# Patient Record
Sex: FEMALE | Race: WHITE | NOT HISPANIC OR LATINO | URBAN - METROPOLITAN AREA
[De-identification: names, ages, dates, MRNs, and addresses within clinical notes are randomized per-mention and may not be internally consistent; named-entity substitution may affect disease eponyms.]

---

## 2017-03-10 ENCOUNTER — IMPORTED ENCOUNTER (OUTPATIENT)
Dept: URBAN - METROPOLITAN AREA CLINIC 1 | Facility: CLINIC | Age: 62
End: 2017-03-10

## 2017-03-10 PROBLEM — H25.813: Noted: 2017-03-10

## 2017-03-10 PROBLEM — H04.123: Noted: 2017-03-10

## 2017-03-10 PROBLEM — D48.5: Noted: 2017-03-10

## 2017-03-10 PROBLEM — H16.143: Noted: 2017-03-10

## 2017-03-10 PROCEDURE — 92012 INTRM OPH EXAM EST PATIENT: CPT

## 2017-03-10 NOTE — PATIENT DISCUSSION
1.  LLL Mass of uncertain behavior- Pt reports rapid increase in growth recently. Discussed with patient to plan/ understand she will have lash loss in area of excision. Will cauterize base and heal by secondary intention. Pt understood. Plan excisional biopsy with pathology. Pros cons risks and benefits discussed. D/c ASA 81mg x10 days prior. 2.  Cataract OU: Observe for now without intervention. The patient was advised to contact us if any change or worsening of vision3. NATY w/ PEK OU- Cont ATs TID OU Routinely.  Schedule Excisional Biopsy LLL

## 2017-12-05 ENCOUNTER — IMPORTED ENCOUNTER (OUTPATIENT)
Dept: URBAN - METROPOLITAN AREA CLINIC 1 | Facility: CLINIC | Age: 62
End: 2017-12-05

## 2017-12-05 PROBLEM — H16.143: Noted: 2017-12-05

## 2017-12-05 PROBLEM — H25.813: Noted: 2017-12-05

## 2017-12-05 PROBLEM — H04.123: Noted: 2017-12-05

## 2017-12-05 PROCEDURE — 92015 DETERMINE REFRACTIVE STATE: CPT

## 2017-12-05 PROCEDURE — 92014 COMPRE OPH EXAM EST PT 1/>: CPT

## 2017-12-05 NOTE — PATIENT DISCUSSION
1.  Cataract OU: MRx for glasses given. 2.  NATY w/ PEK OU- Use ATs TID OU Routinely. 3.  H/o LL Mass of uncertain behavior- Patient had excised at [facility] by Dr. Roly Gilmore (See Attachments) Letter to [facility] for an appointment in 1 yr 27 with Dr. Deedee Mills.

## 2018-03-06 ENCOUNTER — IMPORTED ENCOUNTER (OUTPATIENT)
Dept: URBAN - METROPOLITAN AREA CLINIC 1 | Facility: CLINIC | Age: 63
End: 2018-03-06

## 2018-03-06 PROBLEM — H16.143: Noted: 2018-03-06

## 2018-03-06 PROBLEM — H04.123: Noted: 2018-03-06

## 2018-03-06 PROBLEM — H25.813: Noted: 2018-03-06

## 2018-03-06 PROCEDURE — 92012 INTRM OPH EXAM EST PATIENT: CPT

## 2018-03-06 PROCEDURE — 92015 DETERMINE REFRACTIVE STATE: CPT

## 2018-03-06 NOTE — PATIENT DISCUSSION
1.  Cataract OU:  Visually Significant secondary to glare discussed the risks benefits alternatives and limitations of cataract surgery. The patient stated a full understanding and a desire to proceed with the procedure. The patient will need to return for preop appointment with cataract measurements and to have any additional questions answered and start pre-operative eye drops as directed. Schedule phaco PCL OS then ODOtherwise follow-up in 6 months for a 30/glare2. NATY w/ decreased PEK OU- Improved. The continuation of artificial tears OU TID were recommended. 3.  Return for an appointment for ascan H&P with Dr. Marla Silva.

## 2018-05-01 ENCOUNTER — IMPORTED ENCOUNTER (OUTPATIENT)
Dept: URBAN - METROPOLITAN AREA CLINIC 1 | Facility: CLINIC | Age: 63
End: 2018-05-01

## 2018-05-01 PROBLEM — H25.812: Noted: 2018-05-01

## 2018-05-01 PROCEDURE — 92136 OPHTHALMIC BIOMETRY: CPT

## 2018-05-01 NOTE — PATIENT DISCUSSION
Cataract OS: Visually Significant secondary to glare discussed the risks benefits alternatives and limitations of cataract surgery. The patient stated a full understanding and a desire to proceed with the procedure. The patient will need to start pre-operative eye drops as directed. Proceed w/ phaco PCL OSPt understands they will need glasses post-op to achieve their best corrected vision. Return for an appointment for sx OS with Dr. Hemalatha Whitaker.

## 2018-05-09 ENCOUNTER — IMPORTED ENCOUNTER (OUTPATIENT)
Dept: URBAN - METROPOLITAN AREA CLINIC 1 | Facility: CLINIC | Age: 63
End: 2018-05-09

## 2018-05-09 PROBLEM — H25.812 COMBINED FORM OF SENILE CATARACT OF LEFT EYE: Status: ACTIVE | Noted: 2018-05-09

## 2018-05-09 PROBLEM — H25.812 COMBINED FORM OF SENILE CATARACT OF LEFT EYE: Status: RESOLVED | Noted: 2018-05-09 | Resolved: 2018-05-09

## 2018-05-10 ENCOUNTER — IMPORTED ENCOUNTER (OUTPATIENT)
Dept: URBAN - METROPOLITAN AREA CLINIC 1 | Facility: CLINIC | Age: 63
End: 2018-05-10

## 2018-05-10 PROBLEM — Z96.1: Noted: 2018-05-10

## 2018-05-10 PROCEDURE — 99024 POSTOP FOLLOW-UP VISIT: CPT

## 2018-05-10 NOTE — PATIENT DISCUSSION
POD#1 CE/IOL OS (Symfony Toric- ZXT)  doing well. Continue all 3 gtts as prescribed and until gone. Use Prednisolone BID OS Acular Qdaily OS Ocuflox TID OS Post op Warnings Reiterated RTC as scheduled

## 2018-05-17 ENCOUNTER — IMPORTED ENCOUNTER (OUTPATIENT)
Dept: URBAN - METROPOLITAN AREA CLINIC 1 | Facility: CLINIC | Age: 63
End: 2018-05-17

## 2018-05-17 PROBLEM — Z96.1: Noted: 2018-05-17

## 2018-05-17 PROBLEM — H25.811: Noted: 2018-05-17

## 2018-05-17 PROCEDURE — 92136 OPHTHALMIC BIOMETRY: CPT

## 2018-05-17 NOTE — PATIENT DISCUSSION
1.  Cataract OD: Visually Significant secondary to glare discussed the risks benefits alternatives and limitations of cataract surgery. The patient stated a full understanding and a desire to proceed with the procedure. The patient will need to start pre-operative eye drops as directed. Proceed w/ phaco PCL ODDiscussed with patient and patient understands halos around lights and glare are expected post-op particularly at night with the MF lens choice. Pt understood. 2.  POW#1  CE/IOL Symfony ZXT Toric OS doing well. Discontinue OcufloxContinue Prednisolone BID until gone. Continue Acular QD until gone. 3. Return for an appointment for sx OD with Dr. Harinder Dent.

## 2018-05-23 ENCOUNTER — IMPORTED ENCOUNTER (OUTPATIENT)
Dept: URBAN - METROPOLITAN AREA CLINIC 1 | Facility: CLINIC | Age: 63
End: 2018-05-23

## 2018-05-23 PROBLEM — H25.811 COMBINED FORM OF SENILE CATARACT OF RIGHT EYE: Status: ACTIVE | Noted: 2018-05-23

## 2018-05-23 PROBLEM — Z96.1: Noted: 2018-05-23

## 2018-05-23 PROBLEM — H25.811 COMBINED FORM OF SENILE CATARACT OF RIGHT EYE: Status: RESOLVED | Noted: 2018-05-23 | Resolved: 2018-05-23

## 2018-05-23 PROBLEM — Z98.890: Noted: 2019-11-01

## 2018-05-24 ENCOUNTER — IMPORTED ENCOUNTER (OUTPATIENT)
Dept: URBAN - METROPOLITAN AREA CLINIC 1 | Facility: CLINIC | Age: 63
End: 2018-05-24

## 2018-05-24 PROBLEM — Z96.1: Noted: 2018-05-24

## 2018-05-24 PROCEDURE — 99024 POSTOP FOLLOW-UP VISIT: CPT

## 2018-05-24 NOTE — PATIENT DISCUSSION
1. POD#1 Phaco/ PCL OD (Symfony MF- ZXROO)- doing well. Continue all 3 gtts as prescribed and until gone. Use Prednisolone BID OD Acular Qdaily OD Ocuflox TID OD Post op Warnings Reiterated 2. POW#2 Phaco/ PCL OS (Symfony Toric-ZXT)- doing well Continue all 3 gtts as prescribed and until gone.  Use Prednisolone BID OS till out Use Acular Qdaily OS till out Post op Warnings Reiterated RTC as scheduled

## 2018-06-15 ENCOUNTER — IMPORTED ENCOUNTER (OUTPATIENT)
Dept: URBAN - METROPOLITAN AREA CLINIC 1 | Facility: CLINIC | Age: 63
End: 2018-06-15

## 2018-06-15 PROBLEM — Z96.1: Noted: 2018-06-15

## 2018-06-15 PROCEDURE — 99024 POSTOP FOLLOW-UP VISIT: CPT

## 2018-06-15 NOTE — PATIENT DISCUSSION
POW#3 Phaco/ PCL OU (Symfony MF- ZXROO OD Symfony Toric- ZXT OS) doing well Finish PO meds per schedule; Switch Acular to [place] Qdaily OU till samples out (Samples x4 given) Increase ATs to TID OU Routinely. Can consider Laser Enhancement if indicated. Return for an appointment in 3 mo 30 glare with Dr. Harinder Dent.

## 2018-10-02 ENCOUNTER — IMPORTED ENCOUNTER (OUTPATIENT)
Dept: URBAN - METROPOLITAN AREA CLINIC 1 | Facility: CLINIC | Age: 63
End: 2018-10-02

## 2018-10-02 PROBLEM — Z96.1: Noted: 2018-10-02

## 2018-10-02 PROBLEM — H16.143: Noted: 2018-10-02

## 2018-10-02 PROBLEM — H04.123: Noted: 2018-10-02

## 2018-10-02 PROCEDURE — 92014 COMPRE OPH EXAM EST PT 1/>: CPT

## 2018-10-02 NOTE — PATIENT DISCUSSION
1.  NATY w/ PEK OU- Use ATs TID OU Routienly (Sample of Blink and Systane Complete given) 2. Pseudophakia OU - (Symfony MF ZXROO OD Symfony Toric ZXT OS) - Rx for distance vision only glasses with yellow tint given (patient having issues with night driving due to the rings around lights). Discussed with patient due to the MF lenses OU halos at night with night driving will still be present as discussed prior to Phaco. Discussed if glasses do not improve night vision could consider Alphagan P 30 minutes prior to PM driving. (Advised patient she can call to ask for rx if no better) Letter to PCP Return for an appointment in 1 yr 27 with Dr. Michelle Oh.

## 2019-03-05 NOTE — PATIENT DISCUSSION
REFRACTIVE ERROR, OU - PATIENT IS A GOOD [facility]IDATE [facility].

## 2019-03-05 NOTE — PATIENT DISCUSSION
"The patient is given the patient education document: ""[facility]""

## 2019-10-01 ENCOUNTER — IMPORTED ENCOUNTER (OUTPATIENT)
Dept: URBAN - METROPOLITAN AREA CLINIC 1 | Facility: CLINIC | Age: 64
End: 2019-10-01

## 2019-10-01 PROBLEM — H16.143: Noted: 2019-10-01

## 2019-10-01 PROBLEM — H26.493: Noted: 2019-10-01

## 2019-10-01 PROBLEM — Z96.1: Noted: 2019-10-01

## 2019-10-01 PROBLEM — H04.123: Noted: 2019-10-01

## 2019-10-01 PROCEDURE — 92014 COMPRE OPH EXAM EST PT 1/>: CPT

## 2019-10-01 PROCEDURE — 92015 DETERMINE REFRACTIVE STATE: CPT

## 2019-10-01 NOTE — PATIENT DISCUSSION
1.  PCO OU- Visually Significant secondary to glare; schedule YAG Cap OD then OS. Pros cons risks and benefits. Re-evaluate after YAG OU; Could consider specs for night driving vs. PRK Enhancement PRN. 2.  NATY w/ PEK OU- Use ATs TID OU Routinely. 3.  Pseudophakia OU - (Symfony MF ZXROO OD Symfony Toric ZXT OS) Letter to PCP Schedule YAG Cap OD then OS.

## 2019-11-01 ENCOUNTER — IMPORTED ENCOUNTER (OUTPATIENT)
Dept: URBAN - METROPOLITAN AREA CLINIC 1 | Facility: CLINIC | Age: 64
End: 2019-11-01

## 2019-11-01 PROBLEM — H26.491: Noted: 2019-11-01

## 2019-11-01 PROCEDURE — 66821 AFTER CATARACT LASER SURGERY: CPT

## 2019-11-01 NOTE — PATIENT DISCUSSION
YAG CAP OD: (Consent signed and scanned into attachments) 1 gtt Prolensa applied. The purpose and nature of the procedure possible alternative methods of treatment the risks involved and the possibility of complications were discussed with patient. The Patient wishes to proceed and the consent was signed. The laser was then performed under topical anesthesia with no complications. Post op instructions were given to patient as well as a follow-up appointment. Patient was advised to call our office if any questions or concerns. Return for an appointment for Return as scheduled with Dr. Scarlet Ordonez.

## 2019-11-15 ENCOUNTER — IMPORTED ENCOUNTER (OUTPATIENT)
Dept: URBAN - METROPOLITAN AREA CLINIC 1 | Facility: CLINIC | Age: 64
End: 2019-11-15

## 2019-11-15 PROBLEM — H26.492: Noted: 2019-11-15

## 2019-11-15 PROCEDURE — 66821 AFTER CATARACT LASER SURGERY: CPT

## 2019-11-15 NOTE — PATIENT DISCUSSION
YAG CAP OS: (Consent signed and scanned into attachments) 1 gtt Prolensa applied. The purpose and nature of the procedure possible alternative methods of treatment the risks involved and the possibility of complications were discussed with patient. The Patient wishes to proceed and the consent was signed. The laser was then performed under topical anesthesia with no complications. Post op instructions were given to patient as well as a follow-up appointment. Patient was advised to call our office if any questions or concerns. Return for an appointment in 1-2 months po/yag with Dr. Christiane Dalton.

## 2019-12-16 ENCOUNTER — IMPORTED ENCOUNTER (OUTPATIENT)
Dept: URBAN - METROPOLITAN AREA CLINIC 1 | Facility: CLINIC | Age: 64
End: 2019-12-16

## 2019-12-16 PROBLEM — H26.40: Noted: 2019-12-16

## 2019-12-16 PROCEDURE — 99024 POSTOP FOLLOW-UP VISIT: CPT

## 2019-12-16 NOTE — PATIENT DISCUSSION
PO YAG OU: good result d/c NSAID. Mrx for glasses given. Return for an appointment in October for a 27 with Dr. Gloria Sanchez.

## 2020-06-24 ENCOUNTER — IMPORTED ENCOUNTER (OUTPATIENT)
Dept: URBAN - METROPOLITAN AREA CLINIC 1 | Facility: CLINIC | Age: 65
End: 2020-06-24

## 2020-06-24 PROBLEM — H04.123: Noted: 2020-06-24

## 2020-06-24 PROBLEM — H16.143: Noted: 2020-06-24

## 2020-06-24 PROBLEM — H43.813: Noted: 2020-06-24

## 2020-06-24 PROBLEM — Z96.1: Noted: 2020-06-24

## 2020-06-24 PROCEDURE — 92014 COMPRE OPH EXAM EST PT 1/>: CPT

## 2020-06-24 NOTE — PATIENT DISCUSSION
1.  PVD OU - RD precautions. 2.  NATY w/ PEK OU-The use/continuation of artificial tears were recommended. 3.  Pseudophakia OU 4. Return for an appointment in 2 weeks for 10 DFE and Refraction with Dr. Emily Mansfield.  Follow vision OS

## 2020-07-10 ENCOUNTER — IMPORTED ENCOUNTER (OUTPATIENT)
Dept: URBAN - METROPOLITAN AREA CLINIC 1 | Facility: CLINIC | Age: 65
End: 2020-07-10

## 2020-07-10 PROBLEM — H43.813: Noted: 2020-07-10

## 2020-07-10 PROCEDURE — 92012 INTRM OPH EXAM EST PATIENT: CPT

## 2020-07-10 NOTE — PATIENT DISCUSSION
1.  PVD OU - Stable OU. No holes tears or detachments noted on today's exam. RD precautions. 2.  NATY w/ PEK OU- Use ATs TID OU Routinely. 3.  Pseudophakia OU - (Symfony MF ZXROO OD Symfony Toric ZXT OS)  H/o YAG Cap OUReturn for an appointment for 4-6 weeks 10/DFE with Dr. Hemalatha Whitaker.

## 2020-07-10 NOTE — PATIENT DISCUSSION
NATY w/ PEK OU- Use ATs TID OU Routinely.  3.  Pseudophakia OU - (Symfony MF ZXROO OD Symfony Toric ZXT OS)  H/o YAG Cap OU

## 2020-08-24 ENCOUNTER — IMPORTED ENCOUNTER (OUTPATIENT)
Dept: URBAN - METROPOLITAN AREA CLINIC 1 | Facility: CLINIC | Age: 65
End: 2020-08-24

## 2020-08-24 PROBLEM — H43.813: Noted: 2020-08-24

## 2020-08-24 PROCEDURE — 92012 INTRM OPH EXAM EST PATIENT: CPT

## 2020-08-24 NOTE — PATIENT DISCUSSION
1.  PVD w/o Tear OU - Stable OU. No holes tears or detachments noted on today's exam. Patient was cautioned to call our office immediately if they experience a substantial change in their symptoms such as an increase in floaters persistent flashes loss of visual field (may appear as a shadow or a curtain) or decrease in visual acuity as these may indicate a retinal tear or detachment. 2.  NATY w/ PEK OU- Recommend ATs TID OU Routinely. 3.  Pseudophakia OU - (Symfony MF ZXROO OD Symfony Toric ZXT OS)  H/o YAG Cap OU. May use Alphagan P PRN while driving at night to help with pupil constrictionReturn for an appointment in 6 months 30 with Dr. May Hartmann.

## 2021-03-02 ENCOUNTER — IMPORTED ENCOUNTER (OUTPATIENT)
Dept: URBAN - METROPOLITAN AREA CLINIC 1 | Facility: CLINIC | Age: 66
End: 2021-03-02

## 2021-03-02 PROBLEM — H16.143: Noted: 2021-03-02

## 2021-03-02 PROBLEM — Z96.1: Noted: 2021-03-02

## 2021-03-02 PROBLEM — H43.813: Noted: 2021-03-02

## 2021-03-02 PROBLEM — H04.123: Noted: 2021-03-02

## 2021-03-02 PROCEDURE — 99214 OFFICE O/P EST MOD 30 MIN: CPT

## 2021-03-02 NOTE — PATIENT DISCUSSION
1.  NATY w/ PEK OU- Recommend ATs TID OU routinely 2. Pseudophakia OU - (Symfony MF ZXROO OD Symfony Toric ZXT OS)  H/o YAG Cap OU. May use Alphagan P PRN while driving at night to help with pupil constriction3. PVD OU - RD precautions. Patient deferred Manifest Rx today. Return for an appointment in 1 year 27 with Dr. Jeanne Rodriguez.

## 2021-03-12 ENCOUNTER — OFFICE VISIT (OUTPATIENT)
Dept: ORTHOPEDIC SURGERY | Age: 66
End: 2021-03-12
Payer: COMMERCIAL

## 2021-03-12 VITALS — HEIGHT: 66 IN | BODY MASS INDEX: 26.52 KG/M2 | WEIGHT: 165 LBS

## 2021-03-12 DIAGNOSIS — M17.11 PRIMARY OSTEOARTHRITIS OF RIGHT KNEE: Primary | ICD-10-CM

## 2021-03-12 PROBLEM — M85.80 OSTEOPENIA: Status: ACTIVE | Noted: 2021-03-12

## 2021-03-12 PROBLEM — E66.3 OVERWEIGHT WITH BODY MASS INDEX (BMI) 25.0-29.9: Status: ACTIVE | Noted: 2018-05-31

## 2021-03-12 PROBLEM — M24.019 LOOSE BODY IN JOINT OF SHOULDER REGION: Status: ACTIVE | Noted: 2021-03-12

## 2021-03-12 PROBLEM — I10 BENIGN HYPERTENSION: Status: ACTIVE | Noted: 2021-03-12

## 2021-03-12 PROBLEM — I35.1 NONRHEUMATIC AORTIC VALVE INSUFFICIENCY: Status: ACTIVE | Noted: 2019-10-23

## 2021-03-12 PROBLEM — M65.812 SYNOVITIS OF LEFT SHOULDER: Status: ACTIVE | Noted: 2021-03-12

## 2021-03-12 PROBLEM — M16.11 ARTHRITIS OF RIGHT HIP: Status: ACTIVE | Noted: 2021-03-12

## 2021-03-12 PROBLEM — M19.90 OSTEOARTHROSIS: Status: ACTIVE | Noted: 2021-03-12

## 2021-03-12 PROBLEM — S43.432A SUPERIOR LABRUM ANTERIOR-TO-POSTERIOR (SLAP) TEAR OF LEFT SHOULDER: Status: ACTIVE | Noted: 2021-03-12

## 2021-03-12 PROBLEM — Z98.84 S/P LAPAROSCOPIC SLEEVE GASTRECTOMY: Status: ACTIVE | Noted: 2017-10-31

## 2021-03-12 PROBLEM — M75.102 NONTRAUMATIC TEAR OF LEFT ROTATOR CUFF: Status: ACTIVE | Noted: 2021-03-12

## 2021-03-12 PROBLEM — D03.72 MELANOMA IN SITU OF LEFT LOWER EXTREMITY INCLUDING HIP (HCC): Status: ACTIVE | Noted: 2017-08-29

## 2021-03-12 PROBLEM — I47.1 PAROXYSMAL SVT (SUPRAVENTRICULAR TACHYCARDIA) (HCC): Status: ACTIVE | Noted: 2019-02-11

## 2021-03-12 PROBLEM — E78.00 PURE HYPERCHOLESTEROLEMIA: Status: ACTIVE | Noted: 2021-03-12

## 2021-03-12 PROBLEM — I44.1 WENCKEBACH SECOND DEGREE AV BLOCK: Status: ACTIVE | Noted: 2019-02-11

## 2021-03-12 PROCEDURE — 99203 OFFICE O/P NEW LOW 30 MIN: CPT | Performed by: ORTHOPAEDIC SURGERY

## 2021-03-12 RX ORDER — PERPHENAZINE/AMITRIPTYLINE HCL 2 MG-25 MG
TABLET ORAL
COMMUNITY

## 2021-03-12 RX ORDER — ZOLPIDEM TARTRATE 5 MG/1
TABLET ORAL
COMMUNITY
Start: 2020-12-07 | End: 2021-03-25 | Stop reason: ALTCHOICE

## 2021-03-12 RX ORDER — TRAZODONE HYDROCHLORIDE 50 MG/1
TABLET ORAL
COMMUNITY
Start: 2021-01-27

## 2021-03-12 RX ORDER — CYCLOBENZAPRINE HCL 5 MG
1 TABLET ORAL AS NEEDED
COMMUNITY

## 2021-03-12 RX ORDER — OXYCODONE AND ACETAMINOPHEN 5; 325 MG/1; MG/1
1 TABLET ORAL
Qty: 30 TAB | Refills: 0 | Status: SHIPPED | OUTPATIENT
Start: 2021-03-12 | End: 2021-03-26

## 2021-03-12 RX ORDER — IBUPROFEN 800 MG/1
1 TABLET ORAL
COMMUNITY
End: 2021-03-12

## 2021-03-12 RX ORDER — AMLODIPINE BESYLATE 5 MG/1
5 TABLET ORAL DAILY
COMMUNITY
Start: 2020-10-01 | End: 2021-03-25 | Stop reason: ALTCHOICE

## 2021-03-12 RX ORDER — AMLODIPINE BESYLATE 5 MG/1
TABLET ORAL
COMMUNITY
End: 2021-03-25 | Stop reason: ALTCHOICE

## 2021-03-12 RX ORDER — ERGOCALCIFEROL 1.25 MG/1
CAPSULE ORAL
COMMUNITY
Start: 2020-12-17

## 2021-03-12 RX ORDER — DICLOFENAC SODIUM 10 MG/G
4 GEL TOPICAL 4 TIMES DAILY
COMMUNITY
Start: 2020-12-11

## 2021-03-12 RX ORDER — OXYCODONE HYDROCHLORIDE 5 MG/1
5 TABLET ORAL
COMMUNITY
Start: 2021-01-12 | End: 2021-03-25 | Stop reason: ALTCHOICE

## 2021-03-12 RX ORDER — METHYLPREDNISOLONE 4 MG/1
1 TABLET ORAL
COMMUNITY
Start: 2020-12-21 | End: 2021-03-25 | Stop reason: ALTCHOICE

## 2021-03-12 RX ORDER — CEPHALEXIN 500 MG/1
500 CAPSULE ORAL EVERY 8 HOURS
Qty: 3 CAP | Refills: 0 | Status: SHIPPED | OUTPATIENT
Start: 2021-03-12 | End: 2021-03-13

## 2021-03-12 RX ORDER — DIAZEPAM 5 MG/1
TABLET ORAL
COMMUNITY
Start: 2020-03-20

## 2021-03-12 RX ORDER — OMEPRAZOLE 20 MG/1
CAPSULE, DELAYED RELEASE ORAL
COMMUNITY
Start: 2020-10-01

## 2021-03-12 RX ORDER — OMEPRAZOLE 20 MG/1
CAPSULE, DELAYED RELEASE ORAL
COMMUNITY
End: 2021-03-25 | Stop reason: ALTCHOICE

## 2021-03-12 RX ORDER — AMLODIPINE BESYLATE 2.5 MG/1
TABLET ORAL
COMMUNITY
Start: 2020-12-17

## 2021-03-12 NOTE — PATIENT INSTRUCTIONS
Knee Arthritis: Care Instructions Your Care Instructions Knee arthritis is a breakdown of the cartilage that cushions your knee joint. When the cartilage wears down, your bones rub against each other. This causes pain and stiffness. Knee arthritis tends to get worse with time. Treatment for knee arthritis involves reducing pain, making the leg muscles stronger, and staying at a healthy body weight. The treatment usually does not improve the health of the cartilage, but it can reduce pain and improve how well your knee works. You can take simple measures to protect your knee joints, ease your pain, and help you stay active. Follow-up care is a key part of your treatment and safety. Be sure to make and go to all appointments, and call your doctor if you are having problems. It's also a good idea to know your test results and keep a list of the medicines you take. How can you care for yourself at home? · Know that knee arthritis will cause more pain on some days than on others. · Stay at a healthy weight. Lose weight if you are overweight. When you stand up, the pressure on your knees from every pound of body weight is multiplied four times. So if you lose 10 pounds, you will reduce the pressure on your knees by 40 pounds. · Talk to your doctor or physical therapist about exercises that will help ease joint pain. ? Stretch to help prevent stiffness and to prevent injury before you exercise. You may enjoy gentle forms of yoga to help keep your knee joints and muscles flexible. ? Walk instead of jog. 
? Ride a bike. This makes your thigh muscles stronger and takes pressure off your knee. ? Wear well-fitting and comfortable shoes. ? Exercise in chest-deep water. This can help you exercise longer with less pain. ? Avoid exercises that include squatting or kneeling. They can put a lot of strain on your knees.  
? Talk to your doctor to make sure that the exercise you do is not making the arthritis worse. 
· Do not sit for long periods of time. Try to walk once in a while to keep your knee from getting stiff. · Ask your doctor or physical therapist whether shoe inserts may reduce your arthritis pain. · If you can afford it, get new athletic shoes at least every year. This can help reduce the strain on your knees. · Use a device to help you do everyday activities. ? A cane or walking stick can help you keep your balance when you walk. Hold the cane or walking stick in the hand opposite the painful knee. ? If you feel like you may fall when you walk, try using crutches or a front-wheeled walker. These can prevent falls that could cause more damage to your knee. ? A knee brace may help keep your knee stable and prevent pain. ? You also can use other things to make life easier, such as a higher toilet seat and handrails in the bathtub or shower. · Take pain medicines exactly as directed. ? Do not wait until you are in severe pain. You will get better results if you take it sooner. ? If you are not taking a prescription pain medicine, take an over-the-counter medicine such as acetaminophen (Tylenol), ibuprofen (Advil, Motrin), or naproxen (Aleve). Read and follow all instructions on the label. ? Do not take two or more pain medicines at the same time unless the doctor told you to. Many pain medicines have acetaminophen, which is Tylenol. Too much acetaminophen (Tylenol) can be harmful. ? Tell your doctor if you take a blood thinner, have diabetes, or have allergies to shellfish. · Ask your doctor if you might benefit from a shot of steroid medicine into your knee. This may provide pain relief for several months. · Many people take the supplements glucosamine and chondroitin for osteoarthritis. Some people feel they help, but the medical research does not show that they work. Talk to your doctor before you take these supplements. When should you call for help?  
 Call your doctor now or seek immediate medical care if: 
  · You have sudden swelling, warmth, or pain in your knee.  
  · You have knee pain and a fever or rash.  
  · You have such bad pain that you cannot use your knee. Watch closely for changes in your health, and be sure to contact your doctor if you have any problems. Where can you learn more? Go to http://www.gray.com/ Enter Y222 in the search box to learn more about \"Knee Arthritis: Care Instructions. \" Current as of: December 9, 2019               Content Version: 12.6 © 6164-1032 [employer]. Care instructions adapted under license by [employer] (which disclaims liability or warranty for this information). If you have questions about a medical condition or this instruction, always ask your healthcare professional. [address] any warranty or liability for your use of this information.

## 2021-03-12 NOTE — PROGRESS NOTES
Name: Josue Joshi    : 1955     Service Dept: [address]  [facility] [address] [facility]    Patient's Pharmacies:    [place] [id number], [facility] [address] [place] [id number]  Phone: [phone] Fax: [phone]       Chief Complaint   Patient presents with    Knee Pain        Visit Vitals  Ht 5' 6\" (1.676 m)   Wt 165 lb (74.8 kg)   BMI 26.63 kg/m²      Allergies   Allergen Reactions    Ace Inhibitors Cough and Unknown (comments)    Adhesive Rash     Rash with prolonged use    Adhesive Tape-Silicones Itching     Can use paper tape. Steri-strips are ok. Current Outpatient Medications   Medication Sig Dispense Refill    omeprazole (PRILOSEC) 20 mg capsule TAKE 1 CAPSULE BY MOUTH TWICE DAILY      ergocalciferol (ERGOCALCIFEROL) 1,250 mcg (50,000 unit) capsule TAKE 1 CAPSULE BY MOUTH EVERY 7 DAYS      amLODIPine (NORVASC) 5 mg tablet Take 5 mg by mouth daily.  amLODIPine (NORVASC) 2.5 mg tablet TAKE 1 TABLET BY MOUTH EVERY DAY      traZODone (DESYREL) 50 mg tablet TAKE 1 TABLET BY MOUTH EVERY NIGHT AS NEEDED      zolpidem (AMBIEN) 5 mg tablet TAKE 1 TABLET BY MOUTH EVERY NIGHT AS NEEDED      oxyCODONE IR (ROXICODONE) 5 mg immediate release tablet Take 5 mg by mouth every eight (8) hours as needed.  methylPREDNISolone (MEDROL DOSEPACK) 4 mg tablet Take 1 Tab by mouth.  diclofenac (VOLTAREN) 1 % gel Apply 4 g to affected area four (4) times daily.  diazePAM (VALIUM) 5 mg tablet Take 1 tablet 1-2 hours prior to procedure. Do not drive.  cyclobenzaprine (FLEXERIL) 5 mg tablet Take 1 Tab by mouth as needed.       multivit-iron-FA-calcium-mins (Women's Daily Formula) 18 mg iron-400 mcg-500 mg Ca tab as directed      omeprazole (PRILOSEC) 20 mg capsule 1 capsule 30 minutes before morning meal      amLODIPine (Norvasc) 5 mg tablet 1 tablet      amLODIPine (NORVASC) 10 mg tablet Take 10 mg by mouth daily.  losartan-hydroCHLOROthiazide (HYZAAR) 50-12.5 mg per tablet Take 1 Tab by mouth daily.  ergocalciferol (ERGOCALCIFEROL) 50,000 unit capsule Take 50,000 Units by mouth every seven (7) days. Indications: Vit D      metoprolol succinate (TOPROL-XL) 25 mg XL tablet Take  by mouth daily.  omeprazole (PRILOSEC) 20 mg capsule Take 20 mg by mouth daily.         Patient Active Problem List   Diagnosis Code    Arthritis of right hip M16.11    Asymptomatic varicose veins of both lower extremities I83.93    Basal cell carcinoma of skin C44.91    Benign hypertension I10    Bile duct stricture K83.1    Bile reflux esophagitis K21.00    Colon polyps K63.5    Loose body in joint of shoulder region M24.019    Melanoma in situ of left lower extremity including hip (HCC) D03.72    Multiple thyroid nodules E04.2    Nonrheumatic aortic valve insufficiency I35.1    Nontraumatic tear of left rotator cuff M75.102    Osteoarthrosis M19.90    Osteopenia M85.80    Overweight with body mass index (BMI) 25.0-29.9 E66.3    Paroxysmal SVT (supraventricular tachycardia) ([facility]) I47.1    Pure hypercholesterolemia E78.00    Superior labrum anterior-to-posterior (SLAP) tear of left shoulder S43.432A    S/P laparoscopic sleeve gastrectomy Z98.84    Synovitis of left shoulder M65.812    Vitamin D deficiency E55.9    Wenckebach second degree AV block I44.1      Family History   Problem Relation Age of Onset    Cancer Father       Social History     Socioeconomic History    Marital status:      Spouse name: Not on file    Number of children: Not on file    Years of education: Not on file    Highest education level: Not on file   Tobacco Use    Smoking status: Never Smoker    Smokeless tobacco: Never Used   Substance and Sexual Activity    Alcohol use: No    Drug use: No      Past Surgical History:   Procedure Laterality Date    HX CATARACT REMOVAL Left 05/09/2018    HX CHOLECYSTECTOMY        Past Medical History:   Diagnosis Date    Hypertension         I have reviewed and agree with [facility] and ROS and intake form in chart and the record furthermore I have reviewed prior medical record(s) regarding this patients care during this appointment. Review of Systems:   Patient is a pleasant appearing individual, appropriately dressed, well hydrated, well nourished, who is alert, appropriately oriented for age, and in no acute distress with a normal gait and normal affect who does not appear to be in any significant pain. Physical Exam:  Right Knee -Decrease range of motion with flexion, Knee arc of greater than 50 degrees, Some crepitation, Grossly neurovascularly intact, Good cap refill, No skin lesion, Moderate swelling, No gross instability, Some quadriceps weakness, Kellgren and Bipin at least grade 3    Left Knee - Full Range of Motion, No crepitation, Grossly neurovascularly intact, Good cap refill, No skin lesion, No swelling, No gross instability, No quadriceps weakness    Inpatient status: The patient has admitted to severe pain in the affected knee and due to such pain they are unable to complete activities of daily living at home and/or work on a regular basis where conservative treatments have failed. After extensive discussion with the patient, they have chosen to receive a total knee replacement with the expectation of inpatient procedure. Their dependent functional status (i.e. lack of capable support and safety at home, pain management, comorbities, or difficulty ambulating with assistive walking devices) would deem them a candidate for an inpatient stay. The patient acknowledges and understand the plan.     The risks of surgery were explained to the patient which include but not limited to infection, nerve injury, artery injury, tendon injury, poor result, poor wound healing, unforeseen incidence, bleeding, infection, nerve damage, failure to improve, worsening of symptoms, morbidity, and mortality risks were explained. All questions were answered. Patient was told of no guarantees. Patient accepts all risks and benefits. A consent for surgery will be documented and signed by the patient or a legal guardian. All questions were answered. The procedure was explained in detail. The patient was counseled about the risks of lyubov Covid-19 during their perioperative period and any recovery window from their procedure. The patient was made aware that lyubov Covid-19 may worsen their prognosis for recovering from their procedure and lend to a higher morbidity and/or mortality risk. All material risks, benefits, and reasonable alternatives including postponing the procedure were discussed. The patient DOES wish to proceed with their procedure at this time. Encounter Diagnoses     ICD-10-CM ICD-9-CM   1. Primary osteoarthritis of right knee  M17.11 715.16       HPI:  The patient is here with a chief complaint of right knee pain, throbbing, burning pain. Pain is 6/10. ROS:  10-point review of systems is positive for instability and nighttime pain. X-rays are positive for severe OA of the right knee. Assessment/Plan:  Plan will be for right total knee replacement, general medical clearance and outpatient surgery. She is not on any blood thinners. Percocet and aspirin for DVT prophylaxis. As part of continued conservative pain management options the patient was advised to utilize Tylenol or OTC NSAIDS as long as it is not medically contraindicated. Return to Office: Follow-up and Dispositions    · Return for [facility] for surgery. Scribed by Gagandeep Payne MD as dictated by Stefani Downing. Mouna Euceda MD.  Documentation True and Accepted Aaron Euceda MD

## 2021-03-17 DIAGNOSIS — M25.561 RIGHT KNEE PAIN, UNSPECIFIED CHRONICITY: Primary | ICD-10-CM

## 2021-03-19 DIAGNOSIS — M17.11 PRIMARY OSTEOARTHRITIS OF RIGHT KNEE: Primary | ICD-10-CM

## 2021-03-19 RX ORDER — ONDANSETRON 4 MG/1
4 TABLET, ORALLY DISINTEGRATING ORAL
Qty: 10 TAB | Refills: 0 | Status: SHIPPED | OUTPATIENT
Start: 2021-03-19

## 2021-03-25 ENCOUNTER — VIRTUAL VISIT (OUTPATIENT)
Dept: ORTHOPEDIC SURGERY | Age: 66
End: 2021-03-25
Payer: COMMERCIAL

## 2021-03-25 DIAGNOSIS — M25.561 RIGHT KNEE PAIN, UNSPECIFIED CHRONICITY: ICD-10-CM

## 2021-03-25 DIAGNOSIS — M17.11 OSTEOARTHRITIS OF RIGHT KNEE, UNSPECIFIED OSTEOARTHRITIS TYPE: Primary | ICD-10-CM

## 2021-03-25 PROCEDURE — 99024 POSTOP FOLLOW-UP VISIT: CPT | Performed by: ORTHOPAEDIC SURGERY

## 2021-03-25 NOTE — PATIENT INSTRUCTIONS
Knee Pain or Injury: Care Instructions Your Care Instructions Injuries are a common cause of knee problems. Sudden (acute) injuries may be caused by a direct blow to the knee. They can also be caused by abnormal twisting, bending, or falling on the knee. Pain, bruising, or swelling may be severe, and may start within minutes of the injury. Overuse is another cause of knee pain. Other causes are climbing stairs, kneeling, and other activities that use the knee. Everyday wear and tear, especially as you get older, also can cause knee pain. Rest, along with home treatment, often relieves pain and allows your knee to heal. If you have a serious knee injury, you may need tests and treatment. Follow-up care is a key part of your treatment and safety. Be sure to make and go to all appointments, and call your doctor if you are having problems. It's also a good idea to know your test results and keep a list of the medicines you take. How can you care for yourself at home? · Be safe with medicines. Read and follow all instructions on the label. ? If the doctor gave you a prescription medicine for pain, take it as prescribed. ? If you are not taking a prescription pain medicine, ask your doctor if you can take an over-the-counter medicine. · Rest and protect your knee. Take a break from any activity that may cause pain. · Put ice or a cold pack on your knee for 10 to 20 minutes at a time. Put a thin cloth between the ice and your skin. · Prop up a sore knee on a pillow when you ice it or anytime you sit or lie down for the next 3 days. Try to keep it above the level of your heart. This will help reduce swelling. · If your knee is not swollen, you can put moist heat, a heating pad, or a warm cloth on your knee. · If your doctor recommends an elastic bandage, sleeve, or other type of support for your knee, wear it as directed.  
· Follow your doctor's instructions about how much weight you can put on your leg. Use a cane, crutches, or a walker as instructed. · Follow your doctor's instructions about activity during your healing process. If you can do mild exercise, slowly increase your activity. · Reach and stay at a healthy weight. Extra weight can strain the joints, especially the knees and hips, and make the pain worse. Losing even a few pounds may help. When should you call for help? Call 911 anytime you think you may need emergency care. For example, call if: 
  · You have symptoms of a blood clot in your lung (called a pulmonary embolism). These may include: 
? Sudden chest pain. ? Trouble breathing. ? Coughing up blood. Call your doctor now or seek immediate medical care if: 
  · You have severe or increasing pain.  
  · Your leg or foot turns cold or changes color.  
  · You cannot stand or put weight on your knee.  
  · Your knee looks twisted or bent out of shape.  
  · You cannot move your knee.  
  · You have signs of infection, such as: 
? Increased pain, swelling, warmth, or redness. ? Red streaks leading from the knee. ? Pus draining from a place on your knee. ? A fever.  
  · You have signs of a blood clot in your leg (called a deep vein thrombosis), such as: 
? Pain in your calf, back of the knee, thigh, or groin. ? Redness and swelling in your leg or groin. Watch closely for changes in your health, and be sure to contact your doctor if: 
  · You have tingling, weakness, or numbness in your knee.  
  · You have any new symptoms, such as swelling.  
  · You have bruises from a knee injury that last longer than 2 weeks.  
  · You do not get better as expected. Where can you learn more? Go to http://www.gray.com/ Enter K195 in the search box to learn more about \"Knee Pain or Injury: Care Instructions. \" Current as of: June 26, 2019               Content Version: 12.6 © 4529-4270 [employer].   
Care instructions adapted under license by [employer] (which disclaims liability or warranty for this information). If you have questions about a medical condition or this instruction, always ask your healthcare professional. [address] any warranty or liability for your use of this information.

## 2021-03-25 NOTE — PROGRESS NOTES
Name: William Arora    : 1955     Service Dept: [facility] Sports Medicine    Patient's Pharmacies:    [address] [facility] [address]  Phone: [phone] Fax: [phone]       Chief Complaint   Patient presents with    Knee Pain    Surgical Follow-up        There were no vitals taken for this visit. Allergies   Allergen Reactions    Ace Inhibitors Cough and Unknown (comments)    Adhesive Rash     Rash with prolonged use    Adhesive Tape-Silicones Itching     Can use paper tape. Steri-strips are ok. Current Outpatient Medications   Medication Sig Dispense Refill    ondansetron (ZOFRAN ODT) 4 mg disintegrating tablet Take 1 Tab by mouth every eight (8) hours as needed for Nausea or Vomiting. 10 Tab 0    omeprazole (PRILOSEC) 20 mg capsule TAKE 1 CAPSULE BY MOUTH TWICE DAILY      ergocalciferol (ERGOCALCIFEROL) 1,250 mcg (50,000 unit) capsule TAKE 1 CAPSULE BY MOUTH EVERY 7 DAYS      amLODIPine (NORVASC) 5 mg tablet Take 5 mg by mouth daily.  amLODIPine (NORVASC) 2.5 mg tablet TAKE 1 TABLET BY MOUTH EVERY DAY      traZODone (DESYREL) 50 mg tablet TAKE 1 TABLET BY MOUTH EVERY NIGHT AS NEEDED      zolpidem (AMBIEN) 5 mg tablet TAKE 1 TABLET BY MOUTH EVERY NIGHT AS NEEDED      oxyCODONE IR (ROXICODONE) 5 mg immediate release tablet Take 5 mg by mouth every eight (8) hours as needed.  methylPREDNISolone (MEDROL DOSEPACK) 4 mg tablet Take 1 Tab by mouth.  diclofenac (VOLTAREN) 1 % gel Apply 4 g to affected area four (4) times daily.  diazePAM (VALIUM) 5 mg tablet Take 1 tablet 1-2 hours prior to procedure. Do not drive.  cyclobenzaprine (FLEXERIL) 5 mg tablet Take 1 Tab by mouth as needed.       multivit-iron-FA-calcium-mins (Women's Daily Formula) 18 mg iron-400 mcg-500 mg Ca tab as directed      omeprazole (PRILOSEC) 20 mg capsule 1 capsule 30 minutes before morning meal      amLODIPine (Norvasc) 5 mg tablet 1 tablet      oxyCODONE-acetaminophen (Percocet) 5-325 mg per tablet Take 1 Tab by mouth every four to six (4-6) hours as needed for Pain for up to 14 days. Max Daily Amount: 6 Tabs. Do not take until after surgery 30 Tab 0    amLODIPine (NORVASC) 10 mg tablet Take 10 mg by mouth daily.  losartan-hydroCHLOROthiazide (HYZAAR) 50-12.5 mg per tablet Take 1 Tab by mouth daily.  ergocalciferol (ERGOCALCIFEROL) 50,000 unit capsule Take 50,000 Units by mouth every seven (7) days. Indications: Vit D      metoprolol succinate (TOPROL-XL) 25 mg XL tablet Take  by mouth daily.  omeprazole (PRILOSEC) 20 mg capsule Take 20 mg by mouth daily.         Patient Active Problem List   Diagnosis Code    Arthritis of right hip M16.11    Asymptomatic varicose veins of both lower extremities I83.93    Basal cell carcinoma of skin C44.91    Benign hypertension I10    Bile duct stricture K83.1    Bile reflux esophagitis K21.00    Colon polyps K63.5    Loose body in joint of shoulder region M24.019    Melanoma in situ of left lower extremity including hip ([facility]) D03.72    Multiple thyroid nodules E04.2    Nonrheumatic aortic valve insufficiency I35.1    Nontraumatic tear of left rotator cuff M75.102    Osteoarthrosis M19.90    Osteopenia M85.80    Overweight with body mass index (BMI) 25.0-29.9 E66.3    Paroxysmal SVT (supraventricular tachycardia) ([facility]) I47.1    Pure hypercholesterolemia E78.00    Superior labrum anterior-to-posterior (SLAP) tear of left shoulder S43.432A    S/P laparoscopic sleeve gastrectomy Z98.84    Synovitis of left shoulder M65.812    Vitamin D deficiency E55.9    Wenckebach second degree AV block I44.1      Family History   Problem Relation Age of Onset    Cancer Father       Social History     Socioeconomic History    Marital status:      Spouse name: Not on file    Number of children: Not on file    Years of education: Not on file    Highest education level: Not on file   Tobacco Use    Smoking status: Never Smoker    Smokeless tobacco: Never Used   Substance and Sexual Activity    Alcohol use: No    Drug use: No      Past Surgical History:   Procedure Laterality Date    HX CATARACT REMOVAL Left 05/09/2018    HX CHOLECYSTECTOMY        Past Medical History:   Diagnosis Date    Hypertension         I have reviewed and agree with [address] [facility] [address] and ROS and intake form in chart and the record furthermore I have reviewed prior medical record(s) regarding this patients care during this appointment. Review of Systems:   Patient is a pleasant appearing individual, appropriately dressed, well hydrated, well nourished, who is alert, appropriately oriented for age, and in no acute distress with a normal gait and normal affect who does not appear to be in any significant pain. Physical Exam:  Right knee - Neurovascularly intact with good cap refill, full range of motion and full strength, well healed incision noted, no swelling, no erythema, no instability. Left knee - Decrease range of motion with flexion, Some crepitation, Grossly neurovascularly intact, Good cap refill, No skin lesion, Moderate swelling, No gross instability, Some quadriceps weakness   Encounter Diagnoses     ICD-10-CM ICD-9-CM   1. Osteoarthritis of right knee, unspecified osteoarthritis type  M17.11 715.96   2. Right knee pain, unspecified chronicity  M25.561 719.46       HPI:  The patient is here status post left total knee replacement on 3/19/2021. Doing well. Has no complaints. Assessment/Plan:  Plan at this point, yearly appointment, activities as tolerated, weightbearing started, no restrictions. We will see the patient back in a yearly appointment and go from there.       As part of continued conservative pain management options the patient was advised to utilize Tylenol or OTC NSAIDS as long as it is not medically contraindicated. Franklin Negron, was evaluated through a synchronous (real-time) audio-video encounter. The patient (or guardian if applicable) is aware that this is a billable service. Verbal consent to proceed has been obtained within the past 12 months. The visit was conducted pursuant to the emergency declaration under the [address] [place] [address] and the [employer] Act. Patient identification was verified, and a caregiver was present when appropriate. The patient was located in a state where the provider was credentialed to provide care. Return to Office: Follow-up and Dispositions    · Return in 1 year (on 3/25/2022). Scribed by Daniel Verdin LPN as dictated by [facility] MAIDA Pollock MD.  Documentation True and Accepted Aaron Pollock MD

## 2021-04-01 ENCOUNTER — TELEPHONE (OUTPATIENT)
Dept: ORTHOPEDIC SURGERY | Age: 66
End: 2021-04-01

## 2021-04-01 DIAGNOSIS — M17.11 OSTEOARTHRITIS OF RIGHT KNEE, UNSPECIFIED OSTEOARTHRITIS TYPE: Primary | ICD-10-CM

## 2021-04-01 RX ORDER — OXYCODONE AND ACETAMINOPHEN 5; 325 MG/1; MG/1
1 TABLET ORAL
Qty: 30 TAB | Refills: 0 | Status: SHIPPED | OUTPATIENT
Start: 2021-04-01 | End: 2021-04-15

## 2021-04-28 ENCOUNTER — TELEPHONE (OUTPATIENT)
Dept: ORTHOPEDIC SURGERY | Age: 66
End: 2021-04-28

## 2021-04-28 DIAGNOSIS — M17.11 OSTEOARTHRITIS OF RIGHT KNEE, UNSPECIFIED OSTEOARTHRITIS TYPE: Primary | ICD-10-CM

## 2021-04-28 RX ORDER — OXYCODONE AND ACETAMINOPHEN 5; 325 MG/1; MG/1
1 TABLET ORAL
Qty: 30 TAB | Refills: 0 | Status: SHIPPED | OUTPATIENT
Start: 2021-04-28 | End: 2021-05-12

## 2021-04-28 NOTE — TELEPHONE ENCOUNTER
Rt TKA end of March. Would like to get a refill on Percocet at [facility] on [address]. Last refill was 1 month ago.

## 2022-03-01 ENCOUNTER — COMPREHENSIVE EXAM (OUTPATIENT)
Dept: URBAN - METROPOLITAN AREA CLINIC 1 | Facility: CLINIC | Age: 67
End: 2022-03-01

## 2022-03-01 DIAGNOSIS — H04.123: ICD-10-CM

## 2022-03-01 DIAGNOSIS — H16.143: ICD-10-CM

## 2022-03-01 DIAGNOSIS — Z98.890: ICD-10-CM

## 2022-03-01 DIAGNOSIS — Z83.511: ICD-10-CM

## 2022-03-01 DIAGNOSIS — H35.371: ICD-10-CM

## 2022-03-01 DIAGNOSIS — Z96.1: ICD-10-CM

## 2022-03-01 PROCEDURE — 92014 COMPRE OPH EXAM EST PT 1/>: CPT

## 2022-03-01 ASSESSMENT — TONOMETRY
OD_IOP_MMHG: 12
OS_IOP_MMHG: 12

## 2022-03-01 ASSESSMENT — VISUAL ACUITY
OD_SC: 20/40
OS_CC: 20/20
OD_CC: 20/20
OS_SC: 20/25-1

## 2022-03-01 NOTE — PATIENT DISCUSSION
Benign. Discussed option of removal if patient desires. This would be considered a cosmetic procedure. Patient was quoted $300.

## 2022-03-21 DIAGNOSIS — M25.561 RIGHT KNEE PAIN, UNSPECIFIED CHRONICITY: Primary | ICD-10-CM

## 2022-03-25 ENCOUNTER — OFFICE VISIT (OUTPATIENT)
Dept: ORTHOPEDIC SURGERY | Age: 67
End: 2022-03-25
Payer: MEDICARE

## 2022-03-25 DIAGNOSIS — M25.561 RIGHT KNEE PAIN, UNSPECIFIED CHRONICITY: Primary | ICD-10-CM

## 2022-03-25 PROBLEM — M81.0 AGE RELATED OSTEOPOROSIS: Status: ACTIVE | Noted: 2022-03-25

## 2022-03-25 PROBLEM — Z96.659 ARTIFICIAL KNEE JOINT PRESENT: Status: ACTIVE | Noted: 2022-03-25

## 2022-03-25 PROCEDURE — 99213 OFFICE O/P EST LOW 20 MIN: CPT | Performed by: ORTHOPAEDIC SURGERY

## 2022-03-25 RX ORDER — AMLODIPINE BESYLATE 5 MG/1
TABLET ORAL
COMMUNITY
Start: 2022-02-02

## 2022-03-25 RX ORDER — NIACINAMIDE 500 MG
1000 TABLET ORAL DAILY
COMMUNITY

## 2022-03-25 RX ORDER — VALACYCLOVIR HYDROCHLORIDE 500 MG/1
TABLET, FILM COATED ORAL
COMMUNITY
Start: 2021-12-20

## 2022-03-25 RX ORDER — DENOSUMAB 60 MG/ML
INJECTION SUBCUTANEOUS AS DIRECTED
COMMUNITY

## 2022-03-25 NOTE — LETTER
3/28/2022    Patient: John Uribe   YOB: 1955   Date of Visit: 3/25/2022     Sparkle Calle, [address] [place] [phone]  Via Fax: [phone]    Dear Sparkle Calle MD,      Thank you for referring Ms. Kika Hamilton to [address] [place] [address] AND SPORTS MEDICINE for evaluation. My notes for this consultation are attached. If you have questions, please do not hesitate to call me. I look forward to following your patient along with you.       Sincerely,    Lizy Borjas MD

## 2022-03-25 NOTE — PATIENT INSTRUCTIONS
Knee Pain or Injury: Care Instructions  Your Care Instructions     Injuries are a common cause of knee problems. Sudden (acute) injuries may be caused by a direct blow to the knee. They can also be caused by abnormal twisting, bending, or falling on the knee. Pain, bruising, or swelling may be severe, and may start within minutes of the injury. Overuse is another cause of knee pain. Other causes are climbing stairs, kneeling, and other activities that use the knee. Everyday wear and tear, especially as you get older, also can cause knee pain. Rest, along with home treatment, often relieves pain and allows your knee to heal. If you have a serious knee injury, you may need tests and treatment. Follow-up care is a key part of your treatment and safety. Be sure to make and go to all appointments, and call your doctor if you are having problems. It's also a good idea to know your test results and keep a list of the medicines you take. How can you care for yourself at home? · Be safe with medicines. Read and follow all instructions on the label. ? If the doctor gave you a prescription medicine for pain, take it as prescribed. ? If you are not taking a prescription pain medicine, ask your doctor if you can take an over-the-counter medicine. · Rest and protect your knee. Take a break from any activity that may cause pain. · Put ice or a cold pack on your knee for 10 to 20 minutes at a time. Put a thin cloth between the ice and your skin. · Prop up a sore knee on a pillow when you ice it or anytime you sit or lie down for the next 3 days. Try to keep it above the level of your heart. This will help reduce swelling. · If your knee is not swollen, you can put moist heat, a heating pad, or a warm cloth on your knee. · If your doctor recommends an elastic bandage, sleeve, or other type of support for your knee, wear it as directed.   · Follow your doctor's instructions about how much weight you can put on your leg. Use a cane, crutches, or a walker as instructed. · Follow your doctor's instructions about activity during your healing process. If you can do mild exercise, slowly increase your activity. · Reach and stay at a healthy weight. Extra weight can strain the joints, especially the knees and hips, and make the pain worse. Losing even a few pounds may help. When should you call for help? Call 911 anytime you think you may need emergency care. For example, call if:    · You have symptoms of a blood clot in your lung (called a pulmonary embolism). These may include:  ? Sudden chest pain. ? Trouble breathing. ? Coughing up blood. Call your doctor now or seek immediate medical care if:    · You have severe or increasing pain.     · Your leg or foot turns cold or changes color.     · You cannot stand or put weight on your knee.     · Your knee looks twisted or bent out of shape.     · You cannot move your knee.     · You have signs of infection, such as:  ? Increased pain, swelling, warmth, or redness. ? Red streaks leading from the knee. ? Pus draining from a place on your knee. ? A fever.     · You have signs of a blood clot in your leg (called a deep vein thrombosis), such as:  ? Pain in your calf, back of the knee, thigh, or groin. ? Redness and swelling in your leg or groin. Watch closely for changes in your health, and be sure to contact your doctor if:    · You have tingling, weakness, or numbness in your knee.     · You have any new symptoms, such as swelling.     · You have bruises from a knee injury that last longer than 2 weeks.     · You do not get better as expected. Where can you learn more? Go to http://www.gray.com/  Enter K195 in the search box to learn more about \"Knee Pain or Injury: Care Instructions. \"  Current as of: July 1, 2021               Content Version: 13.2  © 4405-5603 [employer], [employer].    Care instructions adapted under license by [employer] (which disclaims liability or warranty for this information). If you have questions about a medical condition or this instruction, always ask your healthcare professional. [address] any warranty or liability for your use of this information.

## 2022-03-25 NOTE — PROGRESS NOTES
Name: Zakia Almaraz    : 1955     Service Dept: [address] and Sports Medicine    Chief Complaint   Patient presents with    Knee Pain        There were no vitals taken for this visit. Allergies   Allergen Reactions    Ace Inhibitors Cough and Unknown (comments)     Other reaction(s): Unknown    Adhesive Rash     Rash with prolonged use  Other reaction(s): Unknown    Adhesive Tape-Silicones Itching     Can use paper tape. Steri-strips are ok. Current Outpatient Medications   Medication Sig Dispense Refill    denosumab (Prolia) 60 mg/mL injection as directed.  niacinamide 500 mg tablet Take 1,000 mg by mouth daily.  valACYclovir (VALTREX) 500 mg tablet TAKE 1 TABLET BY MOUTH TWICE DAILY AT ONSET FOR 3 DAYS FOR HERPES      amLODIPine (NORVASC) 5 mg tablet       ondansetron (ZOFRAN ODT) 4 mg disintegrating tablet Take 1 Tab by mouth every eight (8) hours as needed for Nausea or Vomiting. 10 Tab 0    omeprazole (PRILOSEC) 20 mg capsule TAKE 1 CAPSULE BY MOUTH TWICE DAILY      ergocalciferol (ERGOCALCIFEROL) 1,250 mcg (50,000 unit) capsule TAKE 1 CAPSULE BY MOUTH EVERY 7 DAYS      amLODIPine (NORVASC) 2.5 mg tablet TAKE 1 TABLET BY MOUTH EVERY DAY      traZODone (DESYREL) 50 mg tablet TAKE 1 TABLET BY MOUTH EVERY NIGHT AS NEEDED      diclofenac (VOLTAREN) 1 % gel Apply 4 g to affected area four (4) times daily.  diazePAM (VALIUM) 5 mg tablet Take 1 tablet 1-2 hours prior to procedure. Do not drive.  cyclobenzaprine (FLEXERIL) 5 mg tablet Take 1 Tab by mouth as needed.       multivit-iron-FA-calcium-mins (Women's Daily Formula) 18 mg iron-400 mcg-500 mg Ca tab as directed        Patient Active Problem List   Diagnosis Code    Arthritis of right hip M16.11    Asymptomatic varicose veins of both lower extremities I83.93    Basal cell carcinoma of skin C44.91    Benign hypertension I10    Bile duct stricture K83.1    Bile reflux esophagitis K21.00    Colon polyps K63.5    Loose body in joint of shoulder region M24.019    Melanoma in situ of left lower extremity including hip ([facility]) D03.72    Multiple thyroid nodules E04.2    Nonrheumatic aortic valve insufficiency I35.1    Nontraumatic tear of left rotator cuff M75.102    Osteoarthrosis M19.90    Osteopenia M85.80    Overweight with body mass index (BMI) 25.0-29.9 E66.3    Paroxysmal SVT (supraventricular tachycardia) ([facility]) I47.1    Pure hypercholesterolemia E78.00    Superior labrum anterior-to-posterior (SLAP) tear of left shoulder S43.432A    S/P laparoscopic sleeve gastrectomy Z98.84    Synovitis of left shoulder M65.812    Vitamin D deficiency E55.9    Wenckebach second degree AV block I44.1    Age related osteoporosis M81.0    Artificial knee joint present Z96.659      Family History   Problem Relation Age of Onset    Cancer Father       Social History     Socioeconomic History    Marital status:    Tobacco Use    Smoking status: Never Smoker    Smokeless tobacco: Never Used   Vaping Use    Vaping Use: Never used   Substance and Sexual Activity    Alcohol use: No    Drug use: No      Past Surgical History:   Procedure Laterality Date    HX CATARACT REMOVAL Left 05/09/2018    HX CHOLECYSTECTOMY        Past Medical History:   Diagnosis Date    Hypertension         I have reviewed and agree with [address] [place] [address] Nw and ROS and intake form in chart and the record furthermore I have reviewed prior medical record(s) regarding this patients care during this appointment. Review of Systems:   Patient is a pleasant appearing individual, appropriately dressed, well hydrated, well nourished, who is alert, appropriately oriented for age, and in no acute distress with a normal gait and normal affect who does not appear to be in any significant pain.    Physical Exam:  Right knee - Neurovascularly intact with good cap refill, full range of motion and full strength, well healed incision noted, no swelling, no erythema, no instability. Left knee - Decrease range of motion with flexion, Some crepitation, Grossly neurovascularly intact, Good cap refill, No skin lesion, Moderate swelling, No gross instability, Some quadriceps weakness   Encounter Diagnoses     ICD-10-CM ICD-9-CM   1. Right knee pain, unspecified chronicity  M25.561 719.46       HPI:  The patient is here status post right total knee replacement. Yearly appointment. X-rays show well-placed prosthesis. Assessment/Plan:  Plan at this point, we will see her back in 2 years and go from there. As part of continued conservative pain management options the patient was advised to utilize Tylenol or OTC NSAIDS as long as it is not medically contraindicated. Return to Office: Follow-up and Dispositions    · Return in about 2 years (around 3/25/2024). Scribed by Steve Lorenz LPN as dictated by [facility] MAIDA Andrews MD.  Documentation True and Accepted Aaron Andrews MD

## 2022-03-28 DIAGNOSIS — M25.561 RIGHT KNEE PAIN, UNSPECIFIED CHRONICITY: ICD-10-CM

## 2022-03-28 PROCEDURE — 73562 X-RAY EXAM OF KNEE 3: CPT | Performed by: ORTHOPAEDIC SURGERY

## 2022-04-02 ASSESSMENT — VISUAL ACUITY
OS_CC: 20/25
OS_SC: 20/40-1
OS_SC: J2
OS_SC: J1
OD_GLARE: 20/200
OD_SC: J1
OD_CC: 20/50
OS_CC: 20/20
OD_PH: SC 20/20
OD_CC: 20/25-1
OD_SC: J1
OD_GLARE: 20/400
OS_SC: J2
OS_CC: 20/20
OS_SC: J2
OS_SC: J3
OS_GLARE: 20/200
OD_SC: J2
OD_SC: 20/20
OS_CC: 20/30
OS_SC: 20/25
OS_CC: 20/25-2
OS_GLARE: 20/40
OD_CC: 20/30
OS_CC: 20/30
OS_SC: 20/30
OD_CC: 20/25
OS_CC: J2
OD_SC: J2
OS_CC: 20/20
OD_CC: 20/40
OU_SC: J1
OD_SC: J1
OD_SC: J1
OU_CC: 20/25
OD_CC: 20/40-1
OS_CC: 20/20
OS_CC: 20/30
OD_GLARE: 20/200
OU_CC: 20/20
OS_CC: 20/25
OD_CC: 20/30
OD_CC: J2
OS_SC: 20/20
OS_SC: J1
OD_CC: 20/50
OD_CC: 20/50
OS_CC: 20/25-1
OS_CC: 20/50-1
OD_SC: 20/20
OD_CC: 20/50+1
OS_SC: J1
OS_SC: J2
OD_SC: 20/25
OS_GLARE: 20/400
OD_SC: J1
OS_CC: 20/50
OS_GLARE: 20/200
OD_CC: 20/30
OD_GLARE: 20/40
OD_SC: 20/20

## 2022-04-02 ASSESSMENT — TONOMETRY
OS_IOP_MMHG: 15
OD_IOP_MMHG: 12
OS_IOP_MMHG: 12
OS_IOP_MMHG: 12
OS_IOP_MMHG: 14
OD_IOP_MMHG: 10
OS_IOP_MMHG: 10
OS_IOP_MMHG: 12
OS_IOP_MMHG: 11
OS_IOP_MMHG: 14
OD_IOP_MMHG: 12
OD_IOP_MMHG: 11
OD_IOP_MMHG: 11
OD_IOP_MMHG: 15
OD_IOP_MMHG: 12
OS_IOP_MMHG: 11
OS_IOP_MMHG: 14
OD_IOP_MMHG: 12
OD_IOP_MMHG: 12
OS_IOP_MMHG: 15
OS_IOP_MMHG: 10
OD_IOP_MMHG: 14
OD_IOP_MMHG: 14
OD_IOP_MMHG: 16
OS_IOP_MMHG: 15
OS_IOP_MMHG: 11

## 2022-04-02 ASSESSMENT — KERATOMETRY
OS_K1POWER_DIOPTERS: 41.00
OD_AXISANGLE2_DEGREES: 109
OS_AXISANGLE_DEGREES: 146
OS_K2POWER_DIOPTERS: 40.25
OD_AXISANGLE_DEGREES: 019
OD_K2POWER_DIOPTERS: 40.75
OD_K1POWER_DIOPTERS: 41.00
OS_AXISANGLE2_DEGREES: 056

## 2022-09-01 ENCOUNTER — FOLLOW UP (OUTPATIENT)
Dept: URBAN - METROPOLITAN AREA CLINIC 1 | Facility: CLINIC | Age: 67
End: 2022-09-01

## 2022-09-01 DIAGNOSIS — H04.123: ICD-10-CM

## 2022-09-01 DIAGNOSIS — H16.143: ICD-10-CM

## 2022-09-01 PROCEDURE — 99213 OFFICE O/P EST LOW 20 MIN: CPT

## 2022-09-01 ASSESSMENT — VISUAL ACUITY
OS_SC: 20/30+2
OD_SC: 20/30

## 2022-09-01 ASSESSMENT — TONOMETRY
OD_IOP_MMHG: 11
OS_IOP_MMHG: 12

## 2022-09-01 NOTE — PATIENT DISCUSSION
S/p YAG PC OU. Discussed with the patient the option of a laser touch-up to help sharpen patient's vision at distance of the right eye. Instilled -0.75 [employer] Total One CTL in the right eye today to simulate what the laser touch-up would look like. Patient desires [address] due to slight Myopia OD. Okay to schedule PRK OD only, discounted to $1000 per PMG.

## 2022-09-01 NOTE — PATIENT DISCUSSION
Stable. Instructed to call immediately if any new distortion, blurring, decreased vision or eye pain.

## 2024-06-25 ENCOUNTER — COMPREHENSIVE EXAM (OUTPATIENT)
Dept: URBAN - METROPOLITAN AREA CLINIC 1 | Facility: CLINIC | Age: 69
End: 2024-06-25

## 2024-06-25 DIAGNOSIS — H35.371: ICD-10-CM

## 2024-06-25 DIAGNOSIS — H16.143: ICD-10-CM

## 2024-06-25 DIAGNOSIS — H04.123: ICD-10-CM

## 2024-06-25 PROCEDURE — 99214 OFFICE O/P EST MOD 30 MIN: CPT

## 2024-06-25 ASSESSMENT — VISUAL ACUITY
OS_SC: 20/40
OD_CC: 20/25
OD_PH: 20/25
OD_SC: 20/40
OS_CC: 20/30

## 2024-06-25 ASSESSMENT — TONOMETRY
OD_IOP_MMHG: 12
OS_IOP_MMHG: 12

## 2024-11-20 ENCOUNTER — CLINIC PROCEDURE ONLY (OUTPATIENT)
Dept: URBAN - METROPOLITAN AREA CLINIC 1 | Facility: CLINIC | Age: 69
End: 2024-11-20

## 2024-11-20 DIAGNOSIS — H52.11: ICD-10-CM

## 2024-11-20 PROCEDURE — S0810 PHOTOREFRACTIVE KERATECTOMY: HCPCS

## 2024-11-20 RX ORDER — OFLOXACIN 3 MG/ML
1 SOLUTION/ DROPS OPHTHALMIC TWICE A DAY
Start: 2024-11-20

## 2024-11-20 RX ORDER — PREDNISOLONE ACETATE 10 MG/ML
1 SUSPENSION/ DROPS OPHTHALMIC TWICE A DAY
Start: 2024-11-20

## 2024-11-22 ENCOUNTER — POST-OP (OUTPATIENT)
Dept: URBAN - METROPOLITAN AREA CLINIC 1 | Facility: CLINIC | Age: 69
End: 2024-11-22

## 2024-11-22 DIAGNOSIS — Z98.890: ICD-10-CM

## 2024-11-22 PROCEDURE — 66999PO NON CO-MANAGED OTHER SURGERY PO

## 2024-11-26 ENCOUNTER — POST-OP (OUTPATIENT)
Dept: URBAN - METROPOLITAN AREA CLINIC 1 | Facility: CLINIC | Age: 69
End: 2024-11-26

## 2024-11-26 DIAGNOSIS — Z98.890: ICD-10-CM

## 2024-11-26 PROCEDURE — 66999PO NON CO-MANAGED OTHER SURGERY PO

## 2024-12-06 NOTE — PATIENT DISCUSSION
NATY w/ PEK OU- Use ATs TID OU Routinely.  3.  Pseudophakia OU - (Symfony MF ZXROO OD Symfony Toric ZXT OS)  H/o YAG Cap OU 100

## 2024-12-17 ENCOUNTER — POST-OP (OUTPATIENT)
Age: 69
End: 2024-12-17

## 2024-12-17 DIAGNOSIS — Z98.890: ICD-10-CM

## 2024-12-17 PROCEDURE — 66999PO NON CO-MANAGED OTHER SURGERY PO

## 2025-01-28 ENCOUNTER — POST-OP (OUTPATIENT)
Age: 70
End: 2025-01-28

## 2025-01-28 DIAGNOSIS — Z98.890: ICD-10-CM

## 2025-01-28 PROCEDURE — 66999PO NON CO-MANAGED OTHER SURGERY PO

## 2025-05-01 ENCOUNTER — POST-OP (OUTPATIENT)
Age: 70
End: 2025-05-01

## 2025-05-01 DIAGNOSIS — Z98.890: ICD-10-CM

## 2025-05-01 PROCEDURE — 66999PO NON CO-MANAGED OTHER SURGERY PO

## 2025-08-05 ENCOUNTER — EMERGENCY VISIT (OUTPATIENT)
Age: 70
End: 2025-08-05

## 2025-08-05 DIAGNOSIS — D23.122: ICD-10-CM

## 2025-08-05 DIAGNOSIS — B88.0: ICD-10-CM

## 2025-08-05 DIAGNOSIS — H00.15: ICD-10-CM

## 2025-08-05 PROCEDURE — 99213 OFFICE O/P EST LOW 20 MIN: CPT
